# Patient Record
Sex: MALE | Race: BLACK OR AFRICAN AMERICAN | NOT HISPANIC OR LATINO | URBAN - METROPOLITAN AREA
[De-identification: names, ages, dates, MRNs, and addresses within clinical notes are randomized per-mention and may not be internally consistent; named-entity substitution may affect disease eponyms.]

---

## 2018-04-10 ENCOUNTER — EMERGENCY (EMERGENCY)
Facility: HOSPITAL | Age: 33
LOS: 1 days | Discharge: HOME | End: 2018-04-10
Admitting: EMERGENCY MEDICINE
Payer: MEDICAID

## 2018-04-10 VITALS
TEMPERATURE: 98 F | DIASTOLIC BLOOD PRESSURE: 80 MMHG | HEART RATE: 84 BPM | SYSTOLIC BLOOD PRESSURE: 131 MMHG | RESPIRATION RATE: 17 BRPM | OXYGEN SATURATION: 100 %

## 2018-04-10 VITALS
SYSTOLIC BLOOD PRESSURE: 132 MMHG | DIASTOLIC BLOOD PRESSURE: 67 MMHG | OXYGEN SATURATION: 100 % | HEART RATE: 74 BPM | RESPIRATION RATE: 16 BRPM | TEMPERATURE: 98 F

## 2018-04-10 DIAGNOSIS — Z98.890 OTHER SPECIFIED POSTPROCEDURAL STATES: Chronic | ICD-10-CM

## 2018-04-10 PROCEDURE — 73030 X-RAY EXAM OF SHOULDER: CPT | Mod: 26,LT

## 2018-04-10 PROCEDURE — 99282 EMERGENCY DEPT VISIT SF MDM: CPT

## 2018-04-10 NOTE — ED PROVIDER NOTE - PLAN OF CARE
Rest, drink plenty of fluids.  Advance activity as tolerated.  Continue all previously prescribed medications as directed.  Take Motrin (also sold as Advil or Ibuprofen) 400-600 mg (two or three 200 mg over the counter pills) every 8 hours as needed for moderate pain -- take with food. Take Tylenol 650mg (Two 325 mg pills) every 4-6 hours as needed for pain. Follow up with your primary care physician and ortho (referral list provided) in 48-72 hours- bring copies of your results.  Return to the ER for worsening or persistent symptoms, and/or ANY NEW OR CONCERNING SYMPTOMS. If you have issues obtaining follow up, please call: [phone] to obtain a doctor or specialist who takes your insurance in your area.  You may call [phone] to make an appointment with the internal medicine clinic.

## 2018-04-10 NOTE — ED PROVIDER NOTE - UPPER EXTREMITY EXAM, LEFT
Left shoulder pain with ROM. FROM active and passive without crepitus. No redness, warmth, or swelling. Sensation intact to light touch. 2+ DP pulse. Less than 2 second capillary refill. 5/5 strength with flexion and extension of arm, elbow, wrist, and fingers

## 2018-04-10 NOTE — ED PROVIDER NOTE - ATTENDING CONTRIBUTION TO CARE
HPI documented by antonella.  Agreed with:  33y M smoker hx of hernia repair in past presents with left shoulder pain since yesterday. Pt states at work he slipped on wet floor landing on left shoulder causing sudden onset of pain which worsens with movement. Presently 6/10 in severity. Denies fever, chills, head trauma, LOC, neck pain, headache, lightheadedness, numbness, or tingling. No use of blood thinners. No EtOH abuse.  SANTOS DIA, ATTENDING NOTE:  Patient is awake and alert and in no acute distress.  Normocephalic/atraumatic.  Auricles are normal.  Neck supple.  Lungs CTAB, no wheeze, no rhonchi,  no rales.  Heart is regular rate and rhythm.  Abdomen is soft, not distended +BS.  Back is nontender, no CVAT.  Moving all 4 extremities, Shoulder FROM with pain.   Neurologically grossly intact.   Psychiatrically normal mood and affect.  No apparent risk to self or others.     DR. DIA, ATTENDING MD-  I performed a face to face bedside interview with patient regarding history of present illness, review of symptoms and past medical history. I completed an independent physical exam.  I have discussed patient's plan of care with the PA.   I agree with note as stated above, having amended the EMR as needed to reflect my findings. I have discussed the assessment and plan of care.  This includes during the time I functioned as the attending physician for this patient.

## 2018-04-10 NOTE — ED PROVIDER NOTE - PROGRESS NOTE DETAILS
HITESH GAMBINO:  Xray negative for acute pathology.  Pt to follow up with PMD and ortho (referral list provided).

## 2018-04-10 NOTE — ED ADULT TRIAGE NOTE - CHIEF COMPLAINT QUOTE
pt states he had a mechanical fall on wet floor yesterday at work landing on shoulder. no deformity noted. full ROM noted in triage. pt in NAD.

## 2018-04-10 NOTE — ED PROVIDER NOTE - MEDICAL DECISION MAKING DETAILS
33y M smoker hx of hernia repair in past presents with left shoulder pain since yesterday -- likely contusion r/o fx -- supportive care and XR

## 2018-04-10 NOTE — ED PROVIDER NOTE - OBJECTIVE STATEMENT
33y M smoker hx of hernia repair in past presents with left shoulder pain since yesterday. Pt states at work he slipped on wet floor landing on left shoulder causing sudden onset of pain which worsens with movement. Presently 6/10 in severity. Denies fever, chills, head trauma, LOC, neck pain, headache, lightheadedness, numbness, or tingling. No use of blood thinners. No EtOH abuse

## 2018-04-10 NOTE — ED PROVIDER NOTE - CARE PLAN
Principal Discharge DX:	Shoulder pain  Assessment and plan of treatment:	Rest, drink plenty of fluids.  Advance activity as tolerated.  Continue all previously prescribed medications as directed.  Take Motrin (also sold as Advil or Ibuprofen) 400-600 mg (two or three 200 mg over the counter pills) every 8 hours as needed for moderate pain -- take with food. Take Tylenol 650mg (Two 325 mg pills) every 4-6 hours as needed for pain. Follow up with your primary care physician and ortho (referral list provided) in 48-72 hours- bring copies of your results.  Return to the ER for worsening or persistent symptoms, and/or ANY NEW OR CONCERNING SYMPTOMS. If you have issues obtaining follow up, please call: [phone] to obtain a doctor or specialist who takes your insurance in your area.  You may call [phone] to make an appointment with the internal medicine clinic.

## 2018-04-10 NOTE — ED PROVIDER NOTE - NS_EDPROVIDERDISPOUSERTYPE_ED_A_ED
Scribe Attestation (For Scribes USE Only)... I have personally evaluated and examined the patient. The Attending was available to me as a supervising provider if needed./Scribe Attestation (For Scribes USE Only)... I have personally evaluated and examined the patient. The Attending was available to me as a supervising provider if needed./Scribe Attestation (For Scribes USE Only).../Attending Attestation (For Attendings USE Only)...

## 2019-06-25 ENCOUNTER — EMERGENCY (EMERGENCY)
Facility: HOSPITAL | Age: 34
LOS: 1 days | Discharge: HOME | End: 2019-06-25
Admitting: EMERGENCY MEDICINE
Payer: MEDICAID

## 2019-06-25 VITALS
DIASTOLIC BLOOD PRESSURE: 69 MMHG | OXYGEN SATURATION: 100 % | SYSTOLIC BLOOD PRESSURE: 124 MMHG | HEART RATE: 60 BPM | RESPIRATION RATE: 15 BRPM | TEMPERATURE: 98 F

## 2019-06-25 DIAGNOSIS — Z98.890 OTHER SPECIFIED POSTPROCEDURAL STATES: Chronic | ICD-10-CM

## 2019-06-25 PROCEDURE — 99283 EMERGENCY DEPT VISIT LOW MDM: CPT

## 2019-06-25 RX ORDER — IBUPROFEN 200 MG
600 TABLET ORAL ONCE
Refills: 0 | Status: COMPLETED | OUTPATIENT
Start: 2019-06-25 | End: 2019-06-25

## 2019-06-25 NOTE — ED ADULT TRIAGE NOTE - CHIEF COMPLAINT QUOTE
pt states a fork lift ran over his left foot at about 7 pm. pt states he is unable to ambulate. left foot swelling apparent, no obvious deformities. no blood thinner use

## 2019-06-26 PROCEDURE — 73630 X-RAY EXAM OF FOOT: CPT | Mod: 26,LT

## 2019-06-26 RX ADMIN — Medication 600 MILLIGRAM(S): at 00:10

## 2019-06-26 NOTE — ED PROVIDER NOTE - CLINICAL SUMMARY MEDICAL DECISION MAKING FREE TEXT BOX
Left foot injury to 2nd 3rd and 4th digit, contusion vs fx will obtain xray, pain control, ice, follow up podiatry.

## 2019-06-26 NOTE — ED PROVIDER NOTE - PHYSICAL EXAMINATION
Left foot: NV intact, 2+ pulses, moving  all toes, sensation intact, mild swelling noted to 2nd 3rd and 4th digit proximally, no TTP over nails, no bony deformity noted.

## 2019-06-26 NOTE — ED PROCEDURE NOTE - NS ED PERI NEURO NEG
No alcohol with pain medications. Encourage fluids daily.
Post-application: Motor, sensory, and vascular responses intact in the injured extremity./Pre-application: Motor, sensory, and vascular responses intact in the injured extremity./The patient/caregiver verbalized understanding of how to care for the injured extremity with splint

## 2019-06-26 NOTE — ED PROVIDER NOTE - NSFOLLOWUPINSTRUCTIONS_ED_ALL_ED_FT
Follow up with your Primary Medical Doctor in 1-2 days.  Follow up with Podiatry in 1-2 days see attached list.  Rest.  Ice 3-4 x a day x 48 hours.  Elevate foot,  hard sole shoe, use cane to ambulate.  Take Ibuprofen 600mg orally every 8 hours as needed for pain take with food.  Return to the ER for any persistent/worsening or new symptoms weakness, numbness or any concerning symptoms.

## 2019-06-26 NOTE — ED PROVIDER NOTE - OBJECTIVE STATEMENT
33 y/o male with no significant PMHx presents to the ER c/o left 2nd, 3rd and 4th toe pain after a folk lift ran over his foot at 7pm last night while at work.  Pt denies weakness, numbness, tingling.   Pt reports pain with ambulation.
